# Patient Record
Sex: MALE | Race: WHITE
[De-identification: names, ages, dates, MRNs, and addresses within clinical notes are randomized per-mention and may not be internally consistent; named-entity substitution may affect disease eponyms.]

---

## 2018-09-12 ENCOUNTER — HOSPITAL ENCOUNTER (EMERGENCY)
Dept: HOSPITAL 41 - JD.ED | Age: 23
Discharge: HOME | End: 2018-09-12
Payer: COMMERCIAL

## 2018-09-12 VITALS — DIASTOLIC BLOOD PRESSURE: 87 MMHG | SYSTOLIC BLOOD PRESSURE: 153 MMHG

## 2018-09-12 DIAGNOSIS — F84.5: ICD-10-CM

## 2018-09-12 DIAGNOSIS — F42.2: Primary | ICD-10-CM

## 2018-09-12 LAB — APAP SERPL-MCNC: 0 UG/ML (ref 10–30)

## 2018-09-12 PROCEDURE — 80053 COMPREHEN METABOLIC PANEL: CPT

## 2018-09-12 PROCEDURE — 85007 BL SMEAR W/DIFF WBC COUNT: CPT

## 2018-09-12 PROCEDURE — 99283 EMERGENCY DEPT VISIT LOW MDM: CPT

## 2018-09-12 PROCEDURE — 80306 DRUG TEST PRSMV INSTRMNT: CPT

## 2018-09-12 PROCEDURE — 81001 URINALYSIS AUTO W/SCOPE: CPT

## 2018-09-12 PROCEDURE — 36415 COLL VENOUS BLD VENIPUNCTURE: CPT

## 2018-09-12 PROCEDURE — 85027 COMPLETE CBC AUTOMATED: CPT

## 2018-09-12 PROCEDURE — G0480 DRUG TEST DEF 1-7 CLASSES: HCPCS
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
## 2021-06-07 NOTE — EDM.PDOC
ED HPI GENERAL MEDICAL PROBLEM





- General


Chief Complaint: Upper Extremity Injury/Pain


Stated Complaint: hand injury


Time Seen by Provider: 06/07/21 18:27


Source of Information: Reports: Patient


History Limitations: Reports: No Limitations





- History of Present Illness


INITIAL COMMENTS - FREE TEXT/NARRATIVE: 





Patient is a 26-year-old male presenting to the emergency department with 

complaints of pain and swelling to his right hand.  States he had his hand on 

top of a fence post and the  came down on it.  He is able to 

move the hand, however it is painful.  Denies any previous fractures to this 

extremity.


  ** Right Hand


Pain Score (Numeric/FACES): 5





- Related Data


                                    Allergies











Allergy/AdvReac Type Severity Reaction Status Date / Time


 


No Known Allergies Allergy   Verified 06/07/21 18:30











Home Meds: 


                                    Home Meds





. [No Known Home Meds]  06/07/21 [History]











Past Medical History





- Past Health History


Medical/Surgical History: Denies Medical/Surgical History


HEENT History: Reports: Impaired Vision


Cardiovascular History: Reports: None


Respiratory History: Reports: None


Gastrointestinal History: Reports: None


Genitourinary History: Reports: None


Musculoskeletal History: Reports: Fracture


Neurological History: Reports: None


Psychiatric History: Reports: OCD


Other Psychiatric History: "Excessive Thoughts."


Endocrine/Metabolic History: Reports: Obesity/BMI 30+


Hematologic History: Reports: None


Immunologic History: Reports: None


Oncologic (Cancer) History: Reports: None


Dermatologic History: Reports: None





- Infectious Disease History


Infectious Disease History: Reports: None





Social & Family History





- Tobacco Use


Tobacco Use Status *Q: Never Tobacco User





- Caffeine Use


Caffeine Use: Reports: Coffee





- Recreational Drug Use


Recreational Drug Use: No





- Living Situation & Occupation


Living situation: Reports: Single


Occupation: Employed (Works at [employer])





Review of Systems





- Review of Systems


Review Of Systems: Comprehensive ROS is negative, except as noted in HPI.





ED EXAM, GENERAL





- Physical Exam


Exam: See Below


Exam Limited By: No Limitations


General Appearance: Alert, WD/WN, No Apparent Distress


Respiratory/Chest: No Respiratory Distress, Lungs Clear, Normal Breath Sounds, 

No Accessory Muscle Use, Chest Non-Tender


Cardiovascular: Normal Peripheral Pulses, Regular Rate, Rhythm, No Edema, No 

Gallop, No JVD, No Murmur, No Rub


Extremities: Other (Edema with scattered superficial abrasions to the right hand

 overlying the metacarpals.  Patient has full range of motion.  CMS intact 

distal to the injury.)


Neurological: Alert, Oriented, CN II-XII Intact, Normal Cognition, Normal Gait, 

Normal Reflexes, No Motor/Sensory Deficits


Psychiatric: Normal Affect, Normal Mood


Skin Exam: Warm, Dry, Intact, Normal Color, No Rash





Course





- Vital Signs


Last Recorded V/S: 





                                Last Vital Signs











Temp  98.0 F   06/07/21 18:27


 


Pulse  78   06/07/21 18:27


 


Resp  16   06/07/21 18:27


 


BP  125/84   06/07/21 18:27


 


Pulse Ox  98   06/07/21 18:27














- Orders/Labs/Meds


Orders: 





                               Active Orders 24 hr











 Category Date Time Status


 


 Hand Comp Min 3V Rt [CR] Stat Exams  06/07/21 18:35 Taken














- Re-Assessments/Exams


Free Text/Narrative Re-Assessment/Exam: 





06/07/21 19:21


X-ray of the right hand shows a small avulsion fracture at the base of the 

fourth metacarpal.  Patient has been placed in a custom Ortho-Glass splint.  I 

will send referral to orthopedist Dr. Bailey.  Discharge instructions as 

documented.





Departure





- Departure


Time of Disposition: 19:21


Disposition: Home, Self-Care 01


Condition: Good


Clinical Impression: 


Fracture of metacarpal bone


Qualifiers:


 Encounter type: initial encounter Metacarpal bone: fourth Fracture type: closed

 Metacarpal location: base Fracture alignment: nondisplaced Laterality: right 

Qualified Code(s): S62.344A - Nondisplaced fracture of base of fourth metacarpal

 bone, right hand, initial encounter for closed fracture








- Discharge Information


*PRESCRIPTION DRUG MONITORING PROGRAM REVIEWED*: No


*COPY OF PRESCRIPTION DRUG MONITORING REPORT IN PATIENT YOVANNY: No


Instructions:  Metacarpal Fracture, Easy-to-Read


Referrals: 


Wong Clifford MD [Primary Care Provider] - 


Ry Bailey MD [Physician] - 


Additional Instructions: 


You were seen in the emergency department today for pain and swelling to your 

right hand.  X-rays are completed and show a small fracture at the base of the 

fourth metacarpal.  You have been placed in a splint.  This should be kept on at

 all times to be kept clean and dry.  Recommend ice and elevation intermittently

 for the next few days.  He may use Tylenol or ibuprofen as needed for 

discomfort.  Referral has been sent to orthopedist, Dr. Bailey.  Contact his 

office tomorrow to set up a follow-up appointment.  Return to ER as needed.





Sepsis Event Note (ED)





- Evaluation


Sepsis Screening Result: No Definite Risk





- Focused Exam


Vital Signs: 





                                   Vital Signs











  Temp Pulse Resp BP Pulse Ox


 


 06/07/21 18:27  98.0 F  78  16  125/84  98














- My Orders


Last 24 Hours: 





My Active Orders





06/07/21 18:35


Hand Comp Min 3V Rt [CR] Stat 














- Assessment/Plan


Last 24 Hours: 





My Active Orders





06/07/21 18:35


Hand Comp Min 3V Rt [CR] Stat

## 2021-06-08 NOTE — CR
Right hand: 4 views of the right hand were obtained.

 

Comparison: No prior hand exam is available.

 

Soft tissue swelling is identified.  No acute fracture, dislocation or

 other bony abnormality is appreciated.

 

Impression:

1.  Soft tissue swelling.

2.  No acute osseous abnormality is appreciated.

 

Diagnostic code #2